# Patient Record
Sex: FEMALE | Race: WHITE | NOT HISPANIC OR LATINO | Employment: OTHER | URBAN - METROPOLITAN AREA
[De-identification: names, ages, dates, MRNs, and addresses within clinical notes are randomized per-mention and may not be internally consistent; named-entity substitution may affect disease eponyms.]

---

## 2025-06-30 ENCOUNTER — APPOINTMENT (OUTPATIENT)
Dept: RADIOLOGY | Facility: HOSPITAL | Age: 55
End: 2025-06-30
Payer: COMMERCIAL

## 2025-06-30 ENCOUNTER — HOSPITAL ENCOUNTER (EMERGENCY)
Facility: HOSPITAL | Age: 55
Discharge: HOME | End: 2025-06-30
Payer: COMMERCIAL

## 2025-06-30 ENCOUNTER — HOSPITAL ENCOUNTER (OUTPATIENT)
Dept: CARDIOLOGY | Facility: HOSPITAL | Age: 55
Discharge: HOME | End: 2025-06-30
Payer: COMMERCIAL

## 2025-06-30 VITALS
OXYGEN SATURATION: 99 % | BODY MASS INDEX: 23.78 KG/M2 | DIASTOLIC BLOOD PRESSURE: 98 MMHG | SYSTOLIC BLOOD PRESSURE: 156 MMHG | HEART RATE: 94 BPM | WEIGHT: 130 LBS | TEMPERATURE: 98.2 F | RESPIRATION RATE: 18 BRPM

## 2025-06-30 DIAGNOSIS — R42 INTERMITTENT LIGHTHEADEDNESS: Primary | ICD-10-CM

## 2025-06-30 LAB
ALBUMIN SERPL BCP-MCNC: 4.8 G/DL (ref 3.4–5)
ALP SERPL-CCNC: 105 U/L (ref 33–110)
ALT SERPL W P-5'-P-CCNC: 12 U/L (ref 7–45)
ANION GAP SERPL CALC-SCNC: 16 MMOL/L (ref 10–20)
AST SERPL W P-5'-P-CCNC: 19 U/L (ref 9–39)
BASOPHILS # BLD AUTO: 0.05 X10*3/UL (ref 0–0.1)
BASOPHILS NFR BLD AUTO: 0.6 %
BILIRUB SERPL-MCNC: 0.5 MG/DL (ref 0–1.2)
BNP SERPL-MCNC: 47 PG/ML (ref 0–99)
BUN SERPL-MCNC: 11 MG/DL (ref 6–23)
CALCIUM SERPL-MCNC: 10.2 MG/DL (ref 8.6–10.3)
CARDIAC TROPONIN I PNL SERPL HS: 3 NG/L (ref 0–13)
CARDIAC TROPONIN I PNL SERPL HS: <3 NG/L (ref 0–13)
CHLORIDE SERPL-SCNC: 99 MMOL/L (ref 98–107)
CO2 SERPL-SCNC: 28 MMOL/L (ref 21–32)
CREAT SERPL-MCNC: 0.85 MG/DL (ref 0.5–1.05)
EGFRCR SERPLBLD CKD-EPI 2021: 82 ML/MIN/1.73M*2
EOSINOPHIL # BLD AUTO: 0.1 X10*3/UL (ref 0–0.7)
EOSINOPHIL NFR BLD AUTO: 1.2 %
ERYTHROCYTE [DISTWIDTH] IN BLOOD BY AUTOMATED COUNT: 12.4 % (ref 11.5–14.5)
GLUCOSE SERPL-MCNC: 101 MG/DL (ref 74–99)
HCT VFR BLD AUTO: 41.7 % (ref 36–46)
HGB BLD-MCNC: 13.7 G/DL (ref 12–16)
IMM GRANULOCYTES # BLD AUTO: 0.03 X10*3/UL (ref 0–0.7)
IMM GRANULOCYTES NFR BLD AUTO: 0.4 % (ref 0–0.9)
INR PPP: 0.9 (ref 0.9–1.1)
LYMPHOCYTES # BLD AUTO: 1.73 X10*3/UL (ref 1.2–4.8)
LYMPHOCYTES NFR BLD AUTO: 20.4 %
MAGNESIUM SERPL-MCNC: 1.95 MG/DL (ref 1.6–2.4)
MCH RBC QN AUTO: 31.1 PG (ref 26–34)
MCHC RBC AUTO-ENTMCNC: 32.9 G/DL (ref 32–36)
MCV RBC AUTO: 95 FL (ref 80–100)
MONOCYTES # BLD AUTO: 0.7 X10*3/UL (ref 0.1–1)
MONOCYTES NFR BLD AUTO: 8.3 %
NEUTROPHILS # BLD AUTO: 5.85 X10*3/UL (ref 1.2–7.7)
NEUTROPHILS NFR BLD AUTO: 69.1 %
NRBC BLD-RTO: 0 /100 WBCS (ref 0–0)
PLATELET # BLD AUTO: 330 X10*3/UL (ref 150–450)
POTASSIUM SERPL-SCNC: 4 MMOL/L (ref 3.5–5.3)
PROT SERPL-MCNC: 7.9 G/DL (ref 6.4–8.2)
PROTHROMBIN TIME: 10.4 SECONDS (ref 9.8–12.4)
RBC # BLD AUTO: 4.41 X10*6/UL (ref 4–5.2)
SODIUM SERPL-SCNC: 139 MMOL/L (ref 136–145)
WBC # BLD AUTO: 8.5 X10*3/UL (ref 4.4–11.3)

## 2025-06-30 PROCEDURE — 36415 COLL VENOUS BLD VENIPUNCTURE: CPT | Performed by: NURSE PRACTITIONER

## 2025-06-30 PROCEDURE — 80053 COMPREHEN METABOLIC PANEL: CPT | Performed by: NURSE PRACTITIONER

## 2025-06-30 PROCEDURE — 2500000004 HC RX 250 GENERAL PHARMACY W/ HCPCS (ALT 636 FOR OP/ED): Performed by: NURSE PRACTITIONER

## 2025-06-30 PROCEDURE — 96361 HYDRATE IV INFUSION ADD-ON: CPT

## 2025-06-30 PROCEDURE — 71046 X-RAY EXAM CHEST 2 VIEWS: CPT

## 2025-06-30 PROCEDURE — 84484 ASSAY OF TROPONIN QUANT: CPT | Performed by: NURSE PRACTITIONER

## 2025-06-30 PROCEDURE — 70450 CT HEAD/BRAIN W/O DYE: CPT

## 2025-06-30 PROCEDURE — 70450 CT HEAD/BRAIN W/O DYE: CPT | Performed by: RADIOLOGY

## 2025-06-30 PROCEDURE — 85025 COMPLETE CBC W/AUTO DIFF WBC: CPT | Performed by: NURSE PRACTITIONER

## 2025-06-30 PROCEDURE — 96360 HYDRATION IV INFUSION INIT: CPT

## 2025-06-30 PROCEDURE — 83880 ASSAY OF NATRIURETIC PEPTIDE: CPT | Performed by: NURSE PRACTITIONER

## 2025-06-30 PROCEDURE — 71046 X-RAY EXAM CHEST 2 VIEWS: CPT | Mod: FOREIGN READ | Performed by: RADIOLOGY

## 2025-06-30 PROCEDURE — 99285 EMERGENCY DEPT VISIT HI MDM: CPT | Mod: 25

## 2025-06-30 PROCEDURE — 2500000002 HC RX 250 W HCPCS SELF ADMINISTERED DRUGS (ALT 637 FOR MEDICARE OP, ALT 636 FOR OP/ED): Performed by: NURSE PRACTITIONER

## 2025-06-30 PROCEDURE — 85610 PROTHROMBIN TIME: CPT | Performed by: NURSE PRACTITIONER

## 2025-06-30 PROCEDURE — 93005 ELECTROCARDIOGRAM TRACING: CPT

## 2025-06-30 PROCEDURE — 83735 ASSAY OF MAGNESIUM: CPT | Performed by: NURSE PRACTITIONER

## 2025-06-30 RX ORDER — MECLIZINE HYDROCHLORIDE 25 MG/1
25 TABLET ORAL 3 TIMES DAILY PRN
Qty: 20 TABLET | Refills: 0 | Status: SHIPPED | OUTPATIENT
Start: 2025-06-30 | End: 2025-07-10

## 2025-06-30 RX ORDER — MECLIZINE HYDROCHLORIDE 25 MG/1
25 TABLET ORAL ONCE
Status: COMPLETED | OUTPATIENT
Start: 2025-06-30 | End: 2025-06-30

## 2025-06-30 RX ADMIN — SODIUM CHLORIDE 1000 ML: 0.9 INJECTION, SOLUTION INTRAVENOUS at 21:31

## 2025-06-30 RX ADMIN — MECLIZINE HYDROCHLORIDE 25 MG: 25 TABLET ORAL at 21:31

## 2025-06-30 NOTE — ED TRIAGE NOTES
TRIAGE NOTE   I saw the patient as the Clinician in Triage and performed a brief history and physical exam, established acuity, and ordered appropriate tests to develop basic plan of care. Patient will be seen by an [facility], resident and/or physician who will independently evaluate the patient. Please see subsequent provider notes for further details and disposition.     Brief HPI: In brief, Sheela Lombardo is a 54 y.o. female with significant PMH for HTN, HLD, migraines and TBI presenting to ED today from home with significant other for evaluation of dizziness.  Yesterday the patient was at the beach at [facility], she was looking out of the water and became dizzy described as an off-balance sensation, described a spinning sensation, no history of vertigo.  The dizziness is gotten progressively worse and is now exacerbated by any movement of the head.  Denies headache or visual changes.  Reports her blood pressure is likely high because she has not taken her antihypertensives today.  Additionally the patient has had on and off central chest pain for 3 weeks that has become steady today, currently rated 4/10.  Chest pain not improved with Tums or omeprazole.  No radiation of pain and nothing palliates or provokes the chest pain.  Denies fever/chills, cough/cold symptoms, shortness of breath, nausea/vomiting, abdominal pain, urinary symptoms, change in bowel habits or any other complaints.  Vapes, occasional EtOH, no smoking or drug use.    Focused Physical exam:   General: 54-year-old female, awake and alert, orient x 3.  Well-nourished and hydrated.  Nontoxic looking.  Skin: Pink, warm and dry.  Cardiac: Regular rate and rhythm.  Chest pain not reproducible.  Pulmonary: Clear bilaterally.  Abdomen: Rounded soft with bowel sounds, nontender.  No CVA tenderness.  Neuro: Cranial nerves II through XII grossly intact.    Plan/MDM:   54 y.o. female with significant PMH for HTN, HLD, migraines and TBI is evaluated at the  bedside for dizziness described as an off-balance sensation worsening since yesterday and intermittent central chest pain x 3 weeks that has been steady today.  On arrival, blood pressure is elevated at 195/126, this will be reevaluated.  Patient is not tachycardic, hypoxic or febrile.  Neuro intact.  Lungs clear, abdomen soft and nontender.  IV established, will perform cardiac workup with head CT.  Patient is agreeable to this plan.    Please see subsequent provider note for further details and disposition

## 2025-07-01 PROBLEM — G56.20 ULNAR NERVE ENTRAPMENT: Status: ACTIVE | Noted: 2018-02-02

## 2025-07-01 PROBLEM — F41.9 ANXIETY: Status: ACTIVE | Noted: 2018-11-26

## 2025-07-01 PROBLEM — H30.90: Status: ACTIVE | Noted: 2018-11-26

## 2025-07-01 PROBLEM — F41.1 GAD (GENERALIZED ANXIETY DISORDER): Status: ACTIVE | Noted: 2018-11-26

## 2025-07-01 PROBLEM — F19.20 POLYSUBSTANCE (EXCLUDING OPIOIDS) DEPENDENCE (MULTI): Status: ACTIVE | Noted: 2018-11-26

## 2025-07-01 PROBLEM — K76.0 HEPATIC STEATOSIS: Status: ACTIVE | Noted: 2024-09-25

## 2025-07-01 PROBLEM — M79.2 NEURALGIA AND NEURITIS: Status: ACTIVE | Noted: 2025-07-01

## 2025-07-01 PROBLEM — D64.9 ANEMIA: Status: ACTIVE | Noted: 2018-11-26

## 2025-07-01 PROBLEM — R00.2 PALPITATIONS: Status: ACTIVE | Noted: 2020-02-27

## 2025-07-01 PROBLEM — R00.0 TACHYCARDIA: Status: ACTIVE | Noted: 2018-11-26

## 2025-07-01 PROBLEM — E04.1 THYROID NODULE: Status: ACTIVE | Noted: 2023-02-21

## 2025-07-01 PROBLEM — F33.1 MODERATE EPISODE OF RECURRENT MAJOR DEPRESSIVE DISORDER: Status: ACTIVE | Noted: 2018-11-26

## 2025-07-01 PROBLEM — M99.04 SOMATIC DYSFUNCTION OF SACRAL REGION: Status: ACTIVE | Noted: 2023-06-06

## 2025-07-01 PROBLEM — R07.9 CHEST PAIN: Status: ACTIVE | Noted: 2020-02-27

## 2025-07-01 PROBLEM — M99.08 RIB CAGE REGION SOMATIC DYSFUNCTION: Status: ACTIVE | Noted: 2023-06-06

## 2025-07-01 PROBLEM — S06.9XAA TBI (TRAUMATIC BRAIN INJURY) (MULTI): Status: ACTIVE | Noted: 2018-11-26

## 2025-07-01 PROBLEM — M95.5 PELVIC OBLIQUITY: Status: ACTIVE | Noted: 2023-06-06

## 2025-07-01 PROBLEM — N80.9 ENDOMETRIOSIS: Status: ACTIVE | Noted: 2020-09-29

## 2025-07-01 PROBLEM — G56.03 BILATERAL CARPAL TUNNEL SYNDROME: Status: ACTIVE | Noted: 2025-05-05

## 2025-07-01 PROBLEM — E78.5 HYPERLIPEMIA: Status: ACTIVE | Noted: 2020-02-27

## 2025-07-01 PROBLEM — F32.A DEPRESSIVE DISORDER: Status: ACTIVE | Noted: 2025-07-01

## 2025-07-01 PROBLEM — K21.9 GERD (GASTROESOPHAGEAL REFLUX DISEASE): Status: ACTIVE | Noted: 2018-11-26

## 2025-07-01 LAB
ATRIAL RATE: 83 BPM
P AXIS: 76 DEGREES
PR INTERVAL: 201 MS
Q ONSET: 254 MS
QRS COUNT: 13 BEATS
QRS DURATION: 94 MS
QT INTERVAL: 369 MS
QTC CALCULATION(BAZETT): 429 MS
QTC FREDERICIA: 407 MS
R AXIS: -75 DEGREES
T AXIS: 43 DEGREES
T OFFSET: 439 MS
VENTRICULAR RATE: 81 BPM

## 2025-07-01 ASSESSMENT — HEART SCORE
HEART SCORE: 2
ECG: NORMAL
TROPONIN: LESS THAN OR EQUAL TO NORMAL LIMIT
AGE: 45-64
HISTORY: SLIGHTLY SUSPICIOUS
RISK FACTORS: 1-2 RISK FACTORS

## 2025-07-01 NOTE — ED PROVIDER NOTES
HPI   No chief complaint on file.      Patient is a healthy nontoxic-appearing 84-year-old female past medical history of anemia, anxiety, carpal tunnel syndrome, cervical radiculopathy, fibromyalgia, depression, endometriosis, anxiety, esophageal reflux, hepatic steatosis, hyperlipidemia, IBS, neuralgia, polysubstance abuse, tachycardia, TBI, presents the emergency room today for complaint of chest pain and intermittent dizziness.  Patient symptoms been ongoing over the past 3 weeks.  Patient states she was at the beach yesterday and as she was walking she looked down the water and looked up and became dizzy.  Patient states this became worse with movement.  Patient denies any syncopal or syncopal events.  Patient states has been experiencing midsternal chest discomfort no alleviating or worsening factors and resolves on its own spontaneously.  Patient denies any palpitations, shortness of breath difficulty breathing, nausea or vomiting, fever, shaking, or chills              Patient History   Medical History[1]  Surgical History[2]  Family History[3]  Social History[4]    Physical Exam   ED Triage Vitals [06/30/25 1458]   Temperature Heart Rate Respirations BP   36.8 °C (98.2 °F) 94 18 (!) 195/126      Pulse Ox Temp Source Heart Rate Source Patient Position   99 % Temporal Monitor Sitting      BP Location FiO2 (%)     Right arm --       Physical Exam  Vitals and nursing note reviewed.   Constitutional:       General: She is not in acute distress.     Appearance: Normal appearance. She is well-developed and normal weight. She is not ill-appearing, toxic-appearing or diaphoretic.   HENT:      Head: Normocephalic and atraumatic.      Nose: No congestion or rhinorrhea.      Mouth/Throat:      Mouth: Mucous membranes are moist.      Pharynx: No oropharyngeal exudate or posterior oropharyngeal erythema.   Eyes:      General: No visual field deficit or scleral icterus.        Right eye: No discharge.         Left eye: No  discharge.      Extraocular Movements: Extraocular movements intact.      Right eye: Normal extraocular motion and no nystagmus.      Left eye: Normal extraocular motion and no nystagmus.      Conjunctiva/sclera: Conjunctivae normal.      Pupils: Pupils are equal, round, and reactive to light. Pupils are equal.      Right eye: Pupil is round and reactive.      Left eye: Pupil is round and reactive.   Neck:      Vascular: No carotid bruit.   Cardiovascular:      Rate and Rhythm: Normal rate and regular rhythm.      Pulses: Normal pulses.      Heart sounds: Normal heart sounds. No murmur heard.     No friction rub. No gallop.   Pulmonary:      Effort: Pulmonary effort is normal. No respiratory distress.      Breath sounds: Normal breath sounds. No stridor. No wheezing, rhonchi or rales.   Chest:      Chest wall: No tenderness.   Musculoskeletal:         General: No swelling. Normal range of motion.      Cervical back: Normal range of motion and neck supple. No rigidity or tenderness.   Lymphadenopathy:      Cervical: No cervical adenopathy.   Skin:     General: Skin is warm and dry.      Capillary Refill: Capillary refill takes less than 2 seconds.   Neurological:      General: No focal deficit present.      Mental Status: She is alert and oriented to person, place, and time.      GCS: GCS eye subscore is 4. GCS verbal subscore is 5. GCS motor subscore is 6.      Cranial Nerves: No cranial nerve deficit, dysarthria or facial asymmetry.      Sensory: No sensory deficit.      Motor: No weakness.      Coordination: Romberg sign negative. Coordination normal.      Gait: Gait normal.      Deep Tendon Reflexes: Reflexes normal.           ED Course & [facility]   ED Course as of 07/01/25 0216   Mon Jun 30, 2025 2047 Troponin I, High Sensitivity: 3 [EC]   2047 Troponin I, High Sensitivity: <3 [EC]   2048 CT of the head reveals  IMPRESSION:  No evidence of acute cortical infarct or intracranial hemorrhage.      No evidence of  intracranial hemorrhage or displaced skull fracture   [EC]   2048 Chest x-ray reveals  IMPRESSION:  No acute radiographic chest finding   [EC]      ED Course User Index  [EC] ADAM Tyler         Diagnoses as of 07/01/25 0216   Intermittent lightheadedness                 No data recorded       HEART Score: 2 (07/01/25 0214 : ADAM Tyler)                         Medical Decision Making  Given the patient's pain presentation a thorough exam was performed.  Patient blood pressure initially was elevated at 195/126, remain neurologically intact no focal deficits, no neck rigidity, remains hemodynamically stable during emergency evaluation, is afebrile, NIH is 0, GCS 15, no adventitious lung sounds auscultated, speaking clearly no distress, cardiac sounds auscultated are regular, have low suspicion for acute intracranial process, meningitis, mastoiditis, central vertigo, ACS, pulmonary embolism, aortic aneurysm.  Lab work was ordered including EKG, chest x-ray, CT of the head, IV fluid and meclizine.  Lab work reveals several irregularities without any critical lab values, initial troponin Veals value of 3, repeat troponin also revealed a value of 3, chest x-ray as interpreted by radiologist reveals no acute cardiopulmonary process.  CT of the head as interpreted by radiologist feels no evidence of acute cortical infarct or intracranial hemorrhage.  Reevaluation of patient reveals significant improvement she states she does feel better and requesting discharge home.  Patient has a heart score of 2 and I do not believe any further cardiac stratification is warranted at this time.  Patient received prescription for meclizine and I encouraged monitoring symptoms, if they become worse return to the emergency room for further evaluation, otherwise follow primary care provider in the next several weeks.  Patient was agreeable this plan discharged home in stable condition.    ADAM Tyler      Portions of this note were generated using digital voice recognition software, and may contain grammatical errors      Procedure  Procedures       [1]   Past Medical History:  Diagnosis Date    Personal history of diseases of the blood and blood-forming organs and certain disorders involving the immune mechanism     History of anemia    Personal history of other diseases of the digestive system     History of hiatal hernia    Personal history of other diseases of the nervous system and sense organs     History of migraine    Personal history of peptic ulcer disease     History of peptic ulcer    Scoliosis, unspecified 2013    Scoliosis   [2]   Past Surgical History:  Procedure Laterality Date     SECTION, CLASSIC  10/13/2020     Section    OTHER SURGICAL HISTORY  10/13/2020    Gallbladder surgery    OTHER SURGICAL HISTORY  2020    Hysterectomy    OTHER SURGICAL HISTORY  2014    Decompression Tarsal Tunnel Release Left Foot    TONSILLECTOMY  2014    Tonsillectomy   [3] No family history on file.  [4]   Social History  Tobacco Use    Smoking status: Not on file    Smokeless tobacco: Not on file   Substance Use Topics    Alcohol use: Not on file    Drug use: Not on file        HEIDY Tyler-CNP  25 0216
